# Patient Record
(demographics unavailable — no encounter records)

---

## 2024-10-14 NOTE — SOCIAL HISTORY
[TextEntry] : The child lives at home with parents and younger brother  No pets  No exposure to secondhand smoke

## 2024-10-14 NOTE — PHYSICAL EXAM
[Exposed Vessel] : left anterior vessel not exposed [Clear to Auscultation] : lungs were clear to auscultation bilaterally [Wheezing] : no wheezing [Increased Work of Breathing] : no increased work of breathing with use of accessory muscles and retractions [Normal Gait and Station] : normal gait and station [Normal muscle strength, symmetry and tone of facial, head and neck musculature] : normal muscle strength, symmetry and tone of facial, head and neck musculature [Normal] : no cervical lymphadenopathy No

## 2024-10-14 NOTE — HISTORY OF PRESENT ILLNESS
[No change in the review of systems as noted in prior visit date ___] : No change in the review of systems as noted in prior visit date of [unfilled] [de-identified] : 7 yo M with a history of hearing loss Here today for annual hearing test No change in hearing reported by mom Uses the hearing aids No concerns with speech Receives speech services in school No ear or throat infections reported.  PMH: Dx with walking PNA 2 weeks ago - treated with po ab and improving; now with residual cough.    Active Problems [de-identified] : November 27, 2023

## 2024-10-14 NOTE — CONSULT LETTER
[Dear  ___] : Dear  [unfilled], [Consult Letter:] : I had the pleasure of evaluating your patient, [unfilled]. [Please see my note below.] : Please see my note below. [Consult Closing:] : Thank you very much for allowing me to participate in the care of this patient.  If you have any questions, please do not hesitate to contact me. [Sincerely,] : Sincerely, [FreeTextEntry2] : Claudette Berry MD  [FreeTextEntry3] : Rosemary Moreau MD   Pediatric Otolaryngology/ Head & Neck Surgery [facility] , Otolaryngology; [facility]  [facility] [place] [address] Tel [phone] Fax [phone]

## 2024-10-14 NOTE — DATA REVIEWED
[FreeTextEntry1] : An audiogram was performed today to evaluate eustachian tube status and hearing status and the results were reviewed and reveal: Tymps: AD type A tympanogram, AS type A tympanogram Soundfield/Thresholds: mod-[id number] to sev